# Patient Record
Sex: MALE | Race: WHITE | NOT HISPANIC OR LATINO | Employment: OTHER | URBAN - NONMETROPOLITAN AREA
[De-identification: names, ages, dates, MRNs, and addresses within clinical notes are randomized per-mention and may not be internally consistent; named-entity substitution may affect disease eponyms.]

---

## 2023-10-03 ENCOUNTER — TELEPHONE (OUTPATIENT)
Dept: CARDIOLOGY | Facility: CLINIC | Age: 68
End: 2023-10-03

## 2023-10-03 NOTE — TELEPHONE ENCOUNTER
Patient has an allergy to lipitor and was sent a prescription of Atorvastatin of 80mg to [facility] in [place].  Are you aware of this contraindication     Please advise     LUCHO [phone]

## 2023-10-05 ENCOUNTER — PATIENT OUTREACH (OUTPATIENT)
Dept: CARDIOLOGY | Facility: CLINIC | Age: 68
End: 2023-10-05
Payer: MEDICARE

## 2023-10-05 ENCOUNTER — DOCUMENTATION (OUTPATIENT)
Dept: CARDIOLOGY | Facility: CLINIC | Age: 68
End: 2023-10-05
Payer: MEDICARE

## 2023-10-05 NOTE — PROGRESS NOTES
Discharge Facility: [facility] Diagnosis: syncope/collapse  Admission Date: 10/1/23  Discharge Date: 10/4/23    PCP Appointment Date: Cardiologist 10/11/23  Specialist Appointment Date:   Hospital Encounter and Summary: not available at this time   See discharge assessment below for further details     Engagement  Call Start Time: 1010 (10/5/2023 10:20 AM)    Medications  Medications reviewed with patient/caregiver?: -- (no medicaitons rec list) (10/5/2023 10:20 AM)  Is the patient having any side effects they believe may be caused by any medication additions or changes?: No (10/5/2023 10:20 AM)  Does the patient have all medications ordered at discharge?: Yes (He is taking all medications from discharge) (10/5/2023 10:20 AM)  Is the patient taking all medications as directed (includes completed medication regime)?: Yes (10/5/2023 10:20 AM)    Appointments  Does the patient have a primary care provider?: Yes (10/5/2023 10:20 AM)  Care Management Interventions: Verified appointment date/time/provider (Patient see cardiology on 10/11/23) (10/5/2023 10:20 AM)    Self Management  What is the home health agency?: n/a (10/5/2023 10:20 AM)    Patient Teaching  What is the patient's perception of their health status since discharge?: Improving (10/5/2023 10:20 AM)  Is the patient/caregiver able to teach back the hierarchy of who to call/visit for symptoms/problems? PCP, Specialist, Home Health nurse, Urgent Care, ED, 911: Yes (10/5/2023 10:20 AM)    Wrap Up  Wrap Up Additional Comments: -- (Patient states he is feeling better and is now staying with his son. He does states he has follow ups with cardiology and wounds.) (10/5/2023 10:20 AM)  Call End Time: 1022 (10/5/2023 10:20 AM)

## 2023-10-06 ENCOUNTER — PATIENT OUTREACH (OUTPATIENT)
Dept: CARDIOLOGY | Facility: CLINIC | Age: 68
End: 2023-10-06
Payer: MEDICARE

## 2023-10-09 ENCOUNTER — APPOINTMENT (OUTPATIENT)
Dept: CARDIOLOGY | Facility: CLINIC | Age: 68
End: 2023-10-09
Payer: MEDICARE

## 2023-10-10 PROBLEM — M25.461 KNEE EFFUSION, RIGHT: Status: ACTIVE | Noted: 2022-02-07

## 2023-10-10 PROBLEM — D72.829 LEUKOCYTOSIS: Status: ACTIVE | Noted: 2022-02-08

## 2023-10-10 PROBLEM — M17.11 PRIMARY OSTEOARTHRITIS OF RIGHT KNEE: Status: ACTIVE | Noted: 2021-10-12

## 2023-10-10 PROBLEM — F10.10 ETOH ABUSE: Status: ACTIVE | Noted: 2022-02-09

## 2023-10-10 PROBLEM — F17.200 CURRENT EVERY DAY SMOKER: Status: ACTIVE | Noted: 2023-10-10

## 2023-10-10 PROBLEM — Z98.61 HISTORY OF PTCA: Status: ACTIVE | Noted: 2023-10-10

## 2023-10-10 PROBLEM — Z96.651 S/P RIGHT UNICOMPARTMENTAL KNEE REPLACEMENT: Status: ACTIVE | Noted: 2021-11-08

## 2023-10-10 PROBLEM — T84.53XA INFECTION OF PROSTHETIC RIGHT KNEE JOINT (CMS-HCC): Status: ACTIVE | Noted: 2022-02-07

## 2023-10-10 PROBLEM — E66.3 OVERWEIGHT: Status: ACTIVE | Noted: 2023-09-06

## 2023-10-10 RX ORDER — CLOPIDOGREL BISULFATE 75 MG/1
75 TABLET ORAL
COMMUNITY
Start: 2021-10-13 | End: 2023-10-11 | Stop reason: CLARIF

## 2023-10-10 RX ORDER — MELOXICAM 15 MG/1
15 TABLET ORAL EVERY MORNING
COMMUNITY
Start: 2023-09-06 | End: 2023-10-11

## 2023-10-10 RX ORDER — ENALAPRIL MALEATE 10 MG/1
10 TABLET ORAL DAILY
COMMUNITY
Start: 2006-03-27

## 2023-10-10 RX ORDER — METOPROLOL SUCCINATE 100 MG/1
TABLET, EXTENDED RELEASE ORAL
COMMUNITY
Start: 2023-07-11

## 2023-10-10 RX ORDER — NITROGLYCERIN 0.4 MG/1
TABLET SUBLINGUAL
COMMUNITY
Start: 2023-10-03

## 2023-10-10 RX ORDER — TICAGRELOR 90 MG/1
TABLET ORAL
COMMUNITY
Start: 2023-10-03 | End: 2024-04-17 | Stop reason: ALTCHOICE

## 2023-10-10 RX ORDER — OMEPRAZOLE 20 MG/1
20 CAPSULE, DELAYED RELEASE ORAL
COMMUNITY
End: 2023-10-11

## 2023-10-10 RX ORDER — AMOXICILLIN 875 MG/1
875 TABLET, FILM COATED ORAL 2 TIMES DAILY
COMMUNITY
Start: 2022-05-12 | End: 2023-10-11

## 2023-10-10 RX ORDER — AMOXICILLIN 500 MG/1
CAPSULE ORAL
COMMUNITY
End: 2024-04-17 | Stop reason: ALTCHOICE

## 2023-10-10 RX ORDER — ENALAPRIL MALEATE AND HYDROCHLOROTHIAZIDE 5; 12.5 MG/1; MG/1
TABLET ORAL
COMMUNITY
Start: 2023-08-22 | End: 2024-04-17 | Stop reason: ALTCHOICE

## 2023-10-10 RX ORDER — ASPIRIN 325 MG
1 TABLET ORAL DAILY
COMMUNITY
End: 2023-10-11

## 2023-10-10 RX ORDER — OMEPRAZOLE 20 MG/1
20 TABLET, DELAYED RELEASE ORAL
COMMUNITY

## 2023-10-10 RX ORDER — EZETIMIBE 10 MG/1
1 TABLET ORAL NIGHTLY
COMMUNITY
Start: 2021-11-16 | End: 2023-10-11 | Stop reason: SDUPTHER

## 2023-10-10 RX ORDER — ATORVASTATIN CALCIUM 80 MG/1
TABLET, FILM COATED ORAL
COMMUNITY
Start: 2023-10-03 | End: 2023-10-11

## 2023-10-11 ENCOUNTER — OFFICE VISIT (OUTPATIENT)
Dept: CARDIOLOGY | Facility: CLINIC | Age: 68
End: 2023-10-11
Payer: MEDICARE

## 2023-10-11 VITALS
WEIGHT: 189 LBS | HEART RATE: 66 BPM | SYSTOLIC BLOOD PRESSURE: 136 MMHG | DIASTOLIC BLOOD PRESSURE: 86 MMHG | BODY MASS INDEX: 27.06 KG/M2 | HEIGHT: 70 IN

## 2023-10-11 DIAGNOSIS — Z95.1 HISTORY OF CORONARY ARTERY BYPASS SURGERY: ICD-10-CM

## 2023-10-11 DIAGNOSIS — I25.2 HX OF MYOCARDIAL INFARCTION: ICD-10-CM

## 2023-10-11 DIAGNOSIS — E78.2 MIXED HYPERLIPIDEMIA: ICD-10-CM

## 2023-10-11 DIAGNOSIS — I25.10 ATHEROSCLEROSIS OF NATIVE CORONARY ARTERY OF NATIVE HEART WITHOUT ANGINA PECTORIS: Primary | ICD-10-CM

## 2023-10-11 DIAGNOSIS — I10 ESSENTIAL HYPERTENSION: ICD-10-CM

## 2023-10-11 DIAGNOSIS — Z98.61 HISTORY OF PTCA: ICD-10-CM

## 2023-10-11 PROBLEM — F17.200 CURRENT EVERY DAY SMOKER: Status: RESOLVED | Noted: 2023-10-10 | Resolved: 2023-10-11

## 2023-10-11 PROCEDURE — 1159F MED LIST DOCD IN RCRD: CPT | Performed by: INTERNAL MEDICINE

## 2023-10-11 PROCEDURE — 3075F SYST BP GE 130 - 139MM HG: CPT | Performed by: INTERNAL MEDICINE

## 2023-10-11 PROCEDURE — 99214 OFFICE O/P EST MOD 30 MIN: CPT | Performed by: INTERNAL MEDICINE

## 2023-10-11 PROCEDURE — 3079F DIAST BP 80-89 MM HG: CPT | Performed by: INTERNAL MEDICINE

## 2023-10-11 PROCEDURE — 1036F TOBACCO NON-USER: CPT | Performed by: INTERNAL MEDICINE

## 2023-10-11 RX ORDER — ASPIRIN 81 MG/1
81 TABLET ORAL DAILY
COMMUNITY

## 2023-10-11 RX ORDER — EZETIMIBE 10 MG/1
10 TABLET ORAL NIGHTLY
Qty: 90 TABLET | Refills: 3 | Status: SHIPPED | OUTPATIENT
Start: 2023-10-11 | End: 2024-10-10

## 2023-10-11 ASSESSMENT — PATIENT HEALTH QUESTIONNAIRE - PHQ9
2. FEELING DOWN, DEPRESSED OR HOPELESS: NOT AT ALL
SUM OF ALL RESPONSES TO PHQ9 QUESTIONS 1 AND 2: 0
1. LITTLE INTEREST OR PLEASURE IN DOING THINGS: NOT AT ALL

## 2023-10-11 ASSESSMENT — ENCOUNTER SYMPTOMS
LOSS OF SENSATION IN FEET: 0
DEPRESSION: 0
OCCASIONAL FEELINGS OF UNSTEADINESS: 0

## 2023-10-11 NOTE — PATIENT INSTRUCTIONS
Please bring all medicines, vitamins, and herbal supplements with you when you come to the office.    Prescriptions will not be filled unless you are compliant with your follow up appointments or have a follow up appointment scheduled as per instruction of your physician. Refills should be requested at the time of your visit.     Cardiac rehab referral

## 2023-10-11 NOTE — PROGRESS NOTES
Subjective   Henrique Carvajal is a 68 y.o. male       Chief Complaint    Hospital Follow-up          HPI     Discussed recent mi and amnesia    Patient is seen in early follow-up of recent hospitalization.  He had an episode of confusion and was found wandering the streets in his underwear.  Police took him into custody and to the emergency room.  On arrival it was clear he is having an acute inferolateral myocardial infarction he underwent prompt intervention by Dr. Hidalgo and did well.  Postintervention echo demonstrated minimal impairment of left ventricular function.    He is now doing well.  He denies any angina CHF or arrhythmia symptomatology we reviewed his medical therapy and the rationale.  He was also advised that enrollment in cardiac rehab would have benefits, and he is willing to do so.    We discussed briefly the merits of diet exercise and weight loss and most importantly tobacco abstinence.    Review of Systems   All other systems reviewed and are negative.           Objective   Physical Exam  Constitutional:       Appearance: Normal appearance. He is normal weight.   HENT:      Nose: Nose normal.   Neck:      Vascular: No carotid bruit.   Cardiovascular:      Rate and Rhythm: Normal rate.      Pulses: Normal pulses.      Heart sounds: Normal heart sounds.   Pulmonary:      Effort: Pulmonary effort is normal.   Abdominal:      General: Bowel sounds are normal.      Palpations: Abdomen is soft.   Genitourinary:     Rectum: Normal.   Musculoskeletal:         General: Normal range of motion.      Cervical back: Normal range of motion.      Right lower leg: No edema.      Left lower leg: No edema.   Skin:     General: Skin is warm and dry.   Neurological:      General: No focal deficit present.      Mental Status: He is alert.   Psychiatric:         Mood and Affect: Mood normal.         Behavior: Behavior normal.         Thought Content: Thought content normal.         Judgment: Judgment normal.  "          Current Outpatient Medications:     amoxicillin (Amoxil) 500 mg capsule, Take 500 mg by mouth., Disp: , Rfl:     aspirin 81 mg EC tablet, Take 1 tablet (81 mg) by mouth once daily., Disp: , Rfl:     Brilinta 90 mg tablet, , Disp: , Rfl:     enalapril (Vasotec) 10 mg tablet, Take 1 tablet (10 mg) by mouth once daily., Disp: , Rfl:     metoprolol succinate XL (Toprol-XL) 100 mg 24 hr tablet, , Disp: , Rfl:     nitroglycerin (Nitrostat) 0.4 mg SL tablet, , Disp: , Rfl:     omeprazole OTC (PriLOSEC OTC) 20 mg EC tablet, Take 1 tablet (20 mg) by mouth once daily in the morning. Take before meals., Disp: , Rfl:     enalapriL-hydrochlorothiazide 5-12.5 mg tablet, , Disp: , Rfl:     ezetimibe (Zetia) 10 mg tablet, Take 1 tablet (10 mg) by mouth once daily at bedtime., Disp: , Rfl:        Visit Vitals  /90 (BP Location: Right arm, Patient Position: Sitting)   Pulse 66   Ht 1.778 m (5' 10\")   Wt 85.7 kg (189 lb)   BMI 27.12 kg/m²   Smoking Status Former   BSA 2.06 m²                 Assessment/Plan   1. Atherosclerosis of native coronary artery of native heart without angina pectoris        2. History of coronary artery bypass surgery        3. History of PTCA        4. Hx of myocardial infarction        5. Mixed hyperlipidemia        6. Essential hypertension           "

## 2023-10-18 ENCOUNTER — PATIENT OUTREACH (OUTPATIENT)
Dept: CARDIOLOGY | Facility: CLINIC | Age: 68
End: 2023-10-18
Payer: MEDICARE

## 2023-10-18 NOTE — PROGRESS NOTES
Unable to reach patient for call back after patient's follow up appointment with PCP.   CRISTOM with call back number for patient to call if needed   If no voicemail available call attempts x 2 were made to contact the patient to assist with any questions or concerns patient may have.

## 2023-11-16 ENCOUNTER — PATIENT OUTREACH (OUTPATIENT)
Dept: CARDIOLOGY | Facility: CLINIC | Age: 68
End: 2023-11-16
Payer: MEDICARE

## 2023-12-13 ENCOUNTER — PATIENT OUTREACH (OUTPATIENT)
Dept: CARDIOLOGY | Facility: CLINIC | Age: 68
End: 2023-12-13
Payer: MEDICARE

## 2024-04-17 ENCOUNTER — OFFICE VISIT (OUTPATIENT)
Dept: CARDIOLOGY | Facility: CLINIC | Age: 69
End: 2024-04-17
Payer: MEDICARE

## 2024-04-17 VITALS
WEIGHT: 186 LBS | HEIGHT: 70 IN | HEART RATE: 60 BPM | DIASTOLIC BLOOD PRESSURE: 66 MMHG | BODY MASS INDEX: 26.63 KG/M2 | SYSTOLIC BLOOD PRESSURE: 116 MMHG

## 2024-04-17 DIAGNOSIS — Z95.1 HISTORY OF CORONARY ARTERY BYPASS SURGERY: ICD-10-CM

## 2024-04-17 DIAGNOSIS — I10 ESSENTIAL HYPERTENSION: Primary | ICD-10-CM

## 2024-04-17 DIAGNOSIS — I25.10 ATHEROSCLEROSIS OF NATIVE CORONARY ARTERY OF NATIVE HEART WITHOUT ANGINA PECTORIS: ICD-10-CM

## 2024-04-17 DIAGNOSIS — E78.2 MIXED HYPERLIPIDEMIA: ICD-10-CM

## 2024-04-17 DIAGNOSIS — Z98.61 HISTORY OF PTCA: ICD-10-CM

## 2024-04-17 PROCEDURE — 3078F DIAST BP <80 MM HG: CPT | Performed by: INTERNAL MEDICINE

## 2024-04-17 PROCEDURE — 99214 OFFICE O/P EST MOD 30 MIN: CPT | Performed by: INTERNAL MEDICINE

## 2024-04-17 PROCEDURE — 3074F SYST BP LT 130 MM HG: CPT | Performed by: INTERNAL MEDICINE

## 2024-04-17 PROCEDURE — 1159F MED LIST DOCD IN RCRD: CPT | Performed by: INTERNAL MEDICINE

## 2024-04-17 PROCEDURE — 1036F TOBACCO NON-USER: CPT | Performed by: INTERNAL MEDICINE

## 2024-04-17 RX ORDER — CLOPIDOGREL BISULFATE 75 MG/1
75 TABLET ORAL DAILY
Qty: 90 TABLET | Refills: 3 | Status: SHIPPED | OUTPATIENT
Start: 2024-04-17 | End: 2025-04-17

## 2024-04-17 NOTE — PATIENT INSTRUCTIONS
Please bring all medicines, vitamins, and herbal supplements with you when you come to the office.    Prescriptions will not be filled unless you are compliant with your follow up appointments or have a follow up appointment scheduled as per instruction of your physician. Refills should be requested at the time of your visit.     BMI was above normal measurement. Current weight: 84.4 kg (186 lb)  Weight change since last visit (-) denotes wt loss -3 lbs   Weight loss needed to achieve BMI 25: 12.1 Lbs  Weight loss needed to achieve BMI 30: -22.6 Lbs  Provided instructions on dietary changes  Provided instructions on exercise.

## 2024-04-17 NOTE — PROGRESS NOTES
"Subjective   Henrique Carvajal is a 68 y.o. male       Chief Complaint    Follow-up          HPI     Patient returns in follow-up of problems as noted.  In the interim he has done relatively well but states he has shortness of breath.  He had intervention 6 months ago and it is unlikely that he has had progression of disease or in-stent stenosis because of this I am doubtful that his symptoms are on the basis of coronary disease.  We note he is on Brilinta and I suggested to him this could explain his shortness of breath and because of this (and the fact he forgets to take his evening dose) I suggested stopping the Brilinta and instead reinitiating his Plavix 75 mg a day.  I suggested follow-up with nurse practitioner month to see if his complaints of shortness of breath improved.  If so we will deem them on the basis of Brilinta but if not he may require stress testing with isotope imaging.    Review of other treatment issues demonstrates that treatment and control of lipids and blood pressure is acceptable because of this no suggestions are made regarding adjustment.  Lastly we did advocate the merits of diet and ideal body mass index and he is close to achieving his goal.      Vitals:    04/17/24 1518   BP: 116/66   BP Location: Right arm   Patient Position: Sitting   Pulse: 60   Weight: 84.4 kg (186 lb)   Height: 1.778 m (5' 10\")        Objective   Physical Exam  Constitutional:       Appearance: Normal appearance.   HENT:      Nose: Nose normal.   Neck:      Vascular: No carotid bruit.   Cardiovascular:      Rate and Rhythm: Normal rate.      Pulses: Normal pulses.      Heart sounds: Normal heart sounds.   Pulmonary:      Effort: Pulmonary effort is normal.   Abdominal:      General: Bowel sounds are normal.      Palpations: Abdomen is soft.   Musculoskeletal:         General: Normal range of motion.      Cervical back: Normal range of motion.      Right lower leg: No edema.      Left lower leg: No edema. "   Skin:     General: Skin is warm and dry.   Neurological:      General: No focal deficit present.      Mental Status: He is alert.   Psychiatric:         Mood and Affect: Mood normal.         Behavior: Behavior normal.         Thought Content: Thought content normal.         Judgment: Judgment normal.         Allergies  Lipitor [atorvastatin], Pravastatin, Rosuvastatin, Simvastatin, and Statins-hmg-coa reductase inhibitors     Current Medications    Current Outpatient Medications:     aspirin 81 mg EC tablet, Take 1 tablet (81 mg) by mouth once daily., Disp: , Rfl:     Brilinta 90 mg tablet, , Disp: , Rfl:     enalapril (Vasotec) 10 mg tablet, Take 1 tablet (10 mg) by mouth once daily., Disp: , Rfl:     ezetimibe (Zetia) 10 mg tablet, Take 1 tablet (10 mg) by mouth once daily at bedtime., Disp: 90 tablet, Rfl: 3    metoprolol succinate XL (Toprol-XL) 100 mg 24 hr tablet, , Disp: , Rfl:     nitroglycerin (Nitrostat) 0.4 mg SL tablet, , Disp: , Rfl:     omeprazole OTC (PriLOSEC OTC) 20 mg EC tablet, Take 1 tablet (20 mg) by mouth once daily in the morning. Take before meals., Disp: , Rfl:                      Assessment/Plan      1. Atherosclerosis of native coronary artery of native heart without angina pectoris  Complaints of dyspnea today are possibly due to myocardial ischemia but before reassessing I suggested stopping Brilinta and clinically reassessing in a month.  - Follow Up In Cardiology    2. History of coronary artery bypass surgery  Historically performed in the past.  Up until 6 months ago a durable result has been achieved  - Follow Up In Cardiology    3. History of PTCA  In October of last year.  He had progression of disease necessitating intervention  - Follow Up In Cardiology    4. Essential hypertension  Review of treatment strategy demonstrates good control    5. Mixed hyperlipidemia  Review of treatment strategy demonstrates good control        Scribe Attestation  By signing my name below, I,  Juhi MICHAEL LPN, Scribe   attest that this documentation has been prepared under the direction and in the presence of Grzegorz Edwards MD.     Provider Attestation - Scribe documentation    All medical record entries made by the Scribe were at my direction and personally dictated by me. I have reviewed the chart and agree that the record accurately reflects my personal performance of the history, physical exam, discussion and plan.

## 2024-04-17 NOTE — LETTER
"April 17, 2024     Candido Victor MD  [address] [place] [address]    Patient: Henrique Carvajal   YOB: 1955   Date of Visit: 4/17/2024       Dear Dr. Candido Victor MD:    Thank you for referring Henrique Carvajal to me for evaluation. Below are my notes for this consultation.  If you have questions, please do not hesitate to call me. I look forward to following your patient along with you.       Sincerely,     Grzegorz Edwards MD      CC: No Recipients  ______________________________________________________________________________________    Subjective   Henrique Carvajal is a 68 y.o. male       Chief Complaint    Follow-up          HPI     Patient returns in follow-up of problems as noted.  In the interim he has done relatively well but states he has shortness of breath.  He had intervention 6 months ago and it is unlikely that he has had progression of disease or in-stent stenosis because of this I am doubtful that his symptoms are on the basis of coronary disease.  We note he is on Brilinta and I suggested to him this could explain his shortness of breath and because of this (and the fact he forgets to take his evening dose) I suggested stopping the Brilinta and instead reinitiating his Plavix 75 mg a day.  I suggested follow-up with nurse practitioner month to see if his complaints of shortness of breath improved.  If so we will deem them on the basis of Brilinta but if not he may require stress testing with isotope imaging.    Review of other treatment issues demonstrates that treatment and control of lipids and blood pressure is acceptable because of this no suggestions are made regarding adjustment.  Lastly we did advocate the merits of diet and ideal body mass index and he is close to achieving his goal.      Vitals:    04/17/24 1518   BP: 116/66   BP Location: Right arm   Patient Position: Sitting   Pulse: 60   Weight: 84.4 kg (186 lb)   Height: 1.778 m (5' 10\")    "     Objective   Physical Exam  Constitutional:       Appearance: Normal appearance.   HENT:      Nose: Nose normal.   Neck:      Vascular: No carotid bruit.   Cardiovascular:      Rate and Rhythm: Normal rate.      Pulses: Normal pulses.      Heart sounds: Normal heart sounds.   Pulmonary:      Effort: Pulmonary effort is normal.   Abdominal:      General: Bowel sounds are normal.      Palpations: Abdomen is soft.   Musculoskeletal:         General: Normal range of motion.      Cervical back: Normal range of motion.      Right lower leg: No edema.      Left lower leg: No edema.   Skin:     General: Skin is warm and dry.   Neurological:      General: No focal deficit present.      Mental Status: He is alert.   Psychiatric:         Mood and Affect: Mood normal.         Behavior: Behavior normal.         Thought Content: Thought content normal.         Judgment: Judgment normal.         Allergies  Lipitor [atorvastatin], Pravastatin, Rosuvastatin, Simvastatin, and Statins-hmg-coa reductase inhibitors     Current Medications    Current Outpatient Medications:   •  aspirin 81 mg EC tablet, Take 1 tablet (81 mg) by mouth once daily., Disp: , Rfl:   •  Brilinta 90 mg tablet, , Disp: , Rfl:   •  enalapril (Vasotec) 10 mg tablet, Take 1 tablet (10 mg) by mouth once daily., Disp: , Rfl:   •  ezetimibe (Zetia) 10 mg tablet, Take 1 tablet (10 mg) by mouth once daily at bedtime., Disp: 90 tablet, Rfl: 3  •  metoprolol succinate XL (Toprol-XL) 100 mg 24 hr tablet, , Disp: , Rfl:   •  nitroglycerin (Nitrostat) 0.4 mg SL tablet, , Disp: , Rfl:   •  omeprazole OTC (PriLOSEC OTC) 20 mg EC tablet, Take 1 tablet (20 mg) by mouth once daily in the morning. Take before meals., Disp: , Rfl:                      Assessment/Plan      1. Atherosclerosis of native coronary artery of native heart without angina pectoris  Complaints of dyspnea today are possibly due to myocardial ischemia but before reassessing I suggested stopping Brilinta and  clinically reassessing in a month.  - Follow Up In Cardiology    2. History of coronary artery bypass surgery  Historically performed in the past.  Up until 6 months ago a durable result has been achieved  - Follow Up In Cardiology    3. History of PTCA  In October of last year.  He had progression of disease necessitating intervention  - Follow Up In Cardiology    4. Essential hypertension  Review of treatment strategy demonstrates good control    5. Mixed hyperlipidemia  Review of treatment strategy demonstrates good control        Scribe Attestation  By signing my name below, IJuhi LPN, Scribe   attest that this documentation has been prepared under the direction and in the presence of Grzegorz Edwards MD.     Provider Attestation - Scribe documentation    All medical record entries made by the Scribe were at my direction and personally dictated by me. I have reviewed the chart and agree that the record accurately reflects my personal performance of the history, physical exam, discussion and plan.

## 2024-05-20 ENCOUNTER — OFFICE VISIT (OUTPATIENT)
Dept: CARDIOLOGY | Facility: CLINIC | Age: 69
End: 2024-05-20
Payer: MEDICARE

## 2024-05-20 VITALS
BODY MASS INDEX: 26.83 KG/M2 | HEIGHT: 70 IN | SYSTOLIC BLOOD PRESSURE: 114 MMHG | DIASTOLIC BLOOD PRESSURE: 64 MMHG | HEART RATE: 52 BPM | WEIGHT: 187.4 LBS

## 2024-05-20 DIAGNOSIS — R06.02 SHORTNESS OF BREATH: Primary | ICD-10-CM

## 2024-05-20 DIAGNOSIS — I25.10 ATHEROSCLEROSIS OF NATIVE CORONARY ARTERY OF NATIVE HEART WITHOUT ANGINA PECTORIS: ICD-10-CM

## 2024-05-20 PROBLEM — Z78.9 STATIN INTOLERANCE: Status: ACTIVE | Noted: 2024-05-20

## 2024-05-20 PROCEDURE — 1159F MED LIST DOCD IN RCRD: CPT | Performed by: NURSE PRACTITIONER

## 2024-05-20 PROCEDURE — 1160F RVW MEDS BY RX/DR IN RCRD: CPT | Performed by: NURSE PRACTITIONER

## 2024-05-20 PROCEDURE — 3074F SYST BP LT 130 MM HG: CPT | Performed by: NURSE PRACTITIONER

## 2024-05-20 PROCEDURE — 3008F BODY MASS INDEX DOCD: CPT | Performed by: NURSE PRACTITIONER

## 2024-05-20 PROCEDURE — 1036F TOBACCO NON-USER: CPT | Performed by: NURSE PRACTITIONER

## 2024-05-20 PROCEDURE — 3078F DIAST BP <80 MM HG: CPT | Performed by: NURSE PRACTITIONER

## 2024-05-20 PROCEDURE — 99212 OFFICE O/P EST SF 10 MIN: CPT | Performed by: NURSE PRACTITIONER

## 2024-05-20 ASSESSMENT — ENCOUNTER SYMPTOMS
ORTHOPNEA: 0
SYNCOPE: 0
DYSPNEA ON EXERTION: 0
IRREGULAR HEARTBEAT: 0
NEAR-SYNCOPE: 0
PND: 0
PALPITATIONS: 0

## 2024-05-20 NOTE — LETTER
"May 20, 2024     Candido Victor MD  [address] [place] [address]    Patient: Henrique Carvajal   YOB: 1955   Date of Visit: 5/20/2024       Dear Dr. Candido Victor MD:    Thank you for referring Henrique Carvajal to me for evaluation. Below are my notes for this consultation.  If you have questions, please do not hesitate to call me. I look forward to following your patient along with you.       Sincerely,     America Herzog, APRN-CNP      CC: No Recipients  ______________________________________________________________________________________    Chief Complaint  \"Doing better\"    Reason for Visit  1 month follow-up  Patient presents to the office today for outpatient follow-up for medication change.  Last evaluated in clinic by Dr. Vega April 2024.  At that time patient presented with complaints of dyspnea.  Brilinta was discontinued and he was initiated on Plavix.  He has been compliant with changes.    Presents today ambulatory with steady gait.    History of Present Illness   Patient is a very pleasant 68-year-old who presents today reportedly doing\" better\".  He has noted resolution of his dyspnea off of Brilinta.  He remains aerobically active and this week and was power washing his house, has been remodeling his home and denies any type of exertional symptoms.    Reportedly had lipids checked by PCP.    Is asking to interrupt DAPT for tooth extraction, educated would not be allowed to interrupt until after October 2024.    Patient reports that overall has no complaint(s) of chest pain, chest pressure/discomfort, claudication, dyspnea, and exertional chest pressure/discomfort    Review of Systems   Cardiovascular:  Negative for chest pain, dyspnea on exertion, irregular heartbeat, leg swelling, near-syncope, orthopnea, palpitations, paroxysmal nocturnal dyspnea and syncope.        Visit Vitals  /64 (BP Location: Left arm, Patient Position: Sitting)   Pulse 52 " "  Ht 1.778 m (5' 10\")   Wt 85 kg (187 lb 6.4 oz)   BMI 26.89 kg/m²   Smoking Status Former   BSA 2.05 m²     Physical Exam  Vitals and nursing note reviewed.   Constitutional:       Appearance: Normal appearance.   Cardiovascular:      Rate and Rhythm: Normal rate and regular rhythm.      Heart sounds: Normal heart sounds.   Pulmonary:      Effort: Pulmonary effort is normal.      Breath sounds: Normal breath sounds.   Musculoskeletal:      Cervical back: Full passive range of motion without pain.      Right lower leg: No edema.      Left lower leg: No edema.   Skin:     General: Skin is cool.   Neurological:      Mental Status: He is alert and oriented to person, place, and time.   Psychiatric:         Attention and Perception: Attention normal.         Mood and Affect: Mood normal.         Behavior: Behavior is cooperative.        Allergies   Allergen Reactions   • Lipitor [Atorvastatin] Myalgia   • Pravastatin Myalgia   • Rosuvastatin Unknown     Other Reaction(s): myalgia   • Simvastatin Unknown     Other Reaction(s): Muscular pain   • Statins-Hmg-Coa Reductase Inhibitors Myalgia     Other Reaction(s): Muscular pain    myalgias sudden onset with increase in triglycerides     Current Outpatient Medications   Medication Instructions   • aspirin 81 mg, oral, Daily   • clopidogrel (PLAVIX) 75 mg, oral, Daily   • enalapril (VASOTEC) 10 mg, oral, Daily   • ezetimibe (ZETIA) 10 mg, oral, Nightly   • metoprolol succinate XL (Toprol-XL) 100 mg 24 hr tablet    • nitroglycerin (Nitrostat) 0.4 mg SL tablet    • omeprazole OTC (PRILOSEC OTC) 20 mg, oral, Daily before breakfast      Assessment:    Shortness of breath  Resolved off Brilinta    BMI 26.0-26.9,adult  Reviewed the merits of healthy lifestyle choices on overall cardiovascular health.      Atherosclerosis of coronary artery  October 1, 2023 inferior lateral STEMI    Ramus PCI/Lisandro  OM1 PCI/Lisandro  LIMA-LAD patent    Known occluded sequential SVG-D1-D2  Known occluded " SVG to RCA: Occluded native RCA    LVEF 50%    Current daily activity greater than 4 METS without concerning symptoms    Plan:     Through informed decision making process incorporating patients unique circumstances, the following treatment plan will be initiated:    1.  Prescription drug management of cardiovascular medication for efficacy, adherence to treatment, side effect assessment and polypharmacy. Current treatment clinically warranted and to continue without modifications.    2. Return for follow-up; in the interim, contact the office if new symptoms arise.  Dr. Hidalgo 6 months     America Herzog  MSN, APRN-CNP, PMHNP-BC  [facility]    Please excuse any errors in grammar or translation related to this dictation. Voice recognition software was utilized to prepare this document.

## 2024-05-20 NOTE — ASSESSMENT & PLAN NOTE
October 1, 2023 inferior lateral STEMI    Ramus PCI/Lisandro  OM1 PCI/Lisandro  LIMA-LAD patent    Known occluded sequential SVG-D1-D2  Known occluded SVG to RCA: Occluded native RCA    LVEF 50%    Current daily activity greater than 4 METS without concerning symptoms

## 2024-05-20 NOTE — PROGRESS NOTES
"Chief Complaint  \"Doing better\"    Reason for Visit  1 month follow-up  Patient presents to the office today for outpatient follow-up for medication change.  Last evaluated in clinic by Dr. Vega April 2024.  At that time patient presented with complaints of dyspnea.  Brilinta was discontinued and he was initiated on Plavix.  He has been compliant with changes.    Presents today ambulatory with steady gait.    History of Present Illness   Patient is a very pleasant 68-year-old who presents today reportedly doing\" better\".  He has noted resolution of his dyspnea off of Brilinta.  He remains aerobically active and this week and was power washing his house, has been remodeling his home and denies any type of exertional symptoms.    Reportedly had lipids checked by PCP.    Is asking to interrupt DAPT for tooth extraction, educated would not be allowed to interrupt until after October 2024.    Patient reports that overall has no complaint(s) of chest pain, chest pressure/discomfort, claudication, dyspnea, and exertional chest pressure/discomfort    Review of Systems   Cardiovascular:  Negative for chest pain, dyspnea on exertion, irregular heartbeat, leg swelling, near-syncope, orthopnea, palpitations, paroxysmal nocturnal dyspnea and syncope.        Visit Vitals  /64 (BP Location: Left arm, Patient Position: Sitting)   Pulse 52   Ht 1.778 m (5' 10\")   Wt 85 kg (187 lb 6.4 oz)   BMI 26.89 kg/m²   Smoking Status Former   BSA 2.05 m²     Physical Exam  Vitals and nursing note reviewed.   Constitutional:       Appearance: Normal appearance.   Cardiovascular:      Rate and Rhythm: Normal rate and regular rhythm.      Heart sounds: Normal heart sounds.   Pulmonary:      Effort: Pulmonary effort is normal.      Breath sounds: Normal breath sounds.   Musculoskeletal:      Cervical back: Full passive range of motion without pain.      Right lower leg: No edema.      Left lower leg: No edema.   Skin:     General: Skin is " cool.   Neurological:      Mental Status: He is alert and oriented to person, place, and time.   Psychiatric:         Attention and Perception: Attention normal.         Mood and Affect: Mood normal.         Behavior: Behavior is cooperative.        Allergies   Allergen Reactions    Lipitor [Atorvastatin] Myalgia    Pravastatin Myalgia    Rosuvastatin Unknown     Other Reaction(s): myalgia    Simvastatin Unknown     Other Reaction(s): Muscular pain    Statins-Hmg-Coa Reductase Inhibitors Myalgia     Other Reaction(s): Muscular pain    myalgias sudden onset with increase in triglycerides     Current Outpatient Medications   Medication Instructions    aspirin 81 mg, oral, Daily    clopidogrel (PLAVIX) 75 mg, oral, Daily    enalapril (VASOTEC) 10 mg, oral, Daily    ezetimibe (ZETIA) 10 mg, oral, Nightly    metoprolol succinate XL (Toprol-XL) 100 mg 24 hr tablet     nitroglycerin (Nitrostat) 0.4 mg SL tablet     omeprazole OTC (PRILOSEC OTC) 20 mg, oral, Daily before breakfast      Assessment:    Shortness of breath  Resolved off Brilinta    BMI 26.0-26.9,adult  Reviewed the merits of healthy lifestyle choices on overall cardiovascular health.      Atherosclerosis of coronary artery  October 1, 2023 inferior lateral STEMI    [facility] PCI/[place]  OM1 PCI/[place]  LIMA-LAD patent    Known occluded sequential SVG-D1-D2  Known occluded SVG to RCA: Occluded native RCA    LVEF 50%    Current daily activity greater than 4 METS without concerning symptoms    Plan:     Through informed decision making process incorporating patients unique circumstances, the following treatment plan will be initiated:    1.  Prescription drug management of cardiovascular medication for efficacy, adherence to treatment, side effect assessment and polypharmacy. Current treatment clinically warranted and to continue without modifications.    2. Return for follow-up; in the interim, contact the office if new symptoms arise.  Dr. Hidalgo 6 months     [place]  Raoy Herzog  MSN, APRN-CNP, PMHNP-BC  [facility]    Please excuse any errors in grammar or translation related to this dictation. Voice recognition software was utilized to prepare this document.

## 2024-05-20 NOTE — PATIENT INSTRUCTIONS
Please bring all medicines, vitamins, and herbal supplements with you when you come to the office.    Prescriptions will not be filled unless you are compliant with your follow up appointments or have a follow up appointment scheduled as per instruction of your physician. Refills should be requested at the time of your visit.     PLAN:   Through informed decision making process incorporating patients unique circumstances, the following treatment plan will be initiated:    1.  Prescription drug management of cardiovascular medication for efficacy, adherence to treatment, side effect assessment and polypharmacy. Current treatment clinically warranted and to continue without modifications.    2. Return for follow-up; in the interim, contact the office if new symptoms arise.  Dr. Hidalgo 6 months

## 2024-08-06 ENCOUNTER — APPOINTMENT (OUTPATIENT)
Dept: CARDIOLOGY | Facility: CLINIC | Age: 69
End: 2024-08-06
Payer: MEDICARE

## 2024-08-06 VITALS
HEART RATE: 52 BPM | DIASTOLIC BLOOD PRESSURE: 50 MMHG | SYSTOLIC BLOOD PRESSURE: 142 MMHG | BODY MASS INDEX: 27.85 KG/M2 | WEIGHT: 188 LBS | HEIGHT: 69 IN

## 2024-08-06 DIAGNOSIS — E78.2 MIXED HYPERLIPIDEMIA: ICD-10-CM

## 2024-08-06 DIAGNOSIS — I10 ESSENTIAL HYPERTENSION: ICD-10-CM

## 2024-08-06 DIAGNOSIS — Z98.61 HISTORY OF PTCA: ICD-10-CM

## 2024-08-06 DIAGNOSIS — Z87.891 FORMER SMOKER: ICD-10-CM

## 2024-08-06 DIAGNOSIS — Z95.1 HISTORY OF CORONARY ARTERY BYPASS SURGERY: ICD-10-CM

## 2024-08-06 DIAGNOSIS — I25.2 HX OF MYOCARDIAL INFARCTION: ICD-10-CM

## 2024-08-06 DIAGNOSIS — Z78.9 STATIN INTOLERANCE: ICD-10-CM

## 2024-08-06 DIAGNOSIS — I25.10 ATHEROSCLEROSIS OF CORONARY ARTERY OF NATIVE HEART, UNSPECIFIED VESSEL OR LESION TYPE, UNSPECIFIED WHETHER ANGINA PRESENT: ICD-10-CM

## 2024-08-06 PROCEDURE — 3077F SYST BP >= 140 MM HG: CPT | Performed by: INTERNAL MEDICINE

## 2024-08-06 PROCEDURE — 3008F BODY MASS INDEX DOCD: CPT | Performed by: INTERNAL MEDICINE

## 2024-08-06 PROCEDURE — 1159F MED LIST DOCD IN RCRD: CPT | Performed by: INTERNAL MEDICINE

## 2024-08-06 PROCEDURE — 1036F TOBACCO NON-USER: CPT | Performed by: INTERNAL MEDICINE

## 2024-08-06 PROCEDURE — 99215 OFFICE O/P EST HI 40 MIN: CPT | Performed by: INTERNAL MEDICINE

## 2024-08-06 PROCEDURE — 1160F RVW MEDS BY RX/DR IN RCRD: CPT | Performed by: INTERNAL MEDICINE

## 2024-08-06 PROCEDURE — 3078F DIAST BP <80 MM HG: CPT | Performed by: INTERNAL MEDICINE

## 2024-08-06 NOTE — PROGRESS NOTES
"Subjective   Henrique Carvajal is a 68 y.o. male       Chief Complaint    Annual Exam          68-year-old gentleman returns for follow-up to see me for the first time, former patient of Dr. Edwards's.  He underwent acute coronary syndrome/non-ST elevation MI last October where I took him to the Cath Lab and underwent revascularization of the ramus branch and obtuse marginal branch x 2 drug-eluting stents.  Notably his sequential vein graft to the first and second diagonal branch and graft to the RCA and native RCA are occluded.  LIMA to LAD is patent but distal LAD has diffuse 50 to 70% small vessel disease, left ventricular function is low normal angiographically.    He has no recurrent angina, nitrate usage, hospitalizations, heart failure, shortness of breath.  His dyspnea is improved after switching from Brilinta to clopidogrel.  He is completely intolerant to statins and has tried all 4 different statins unsuccessfully.    He has previous history of myocardial infarction's treated in [facility] and in [place] with stenting and subsequent multivessel CABG in [place] dating back several years.    He is hemodynamically stable, continues working on a daily basis for his NYHA class is I/C    Recommendations, initiate investigation on Repatha injections, continue current therapies, renew Zetia, follow-up in 6 months         Review of Systems   All other systems reviewed and are negative.           Vitals:    08/06/24 1030   BP: 142/50   BP Location: Left arm   Patient Position: Sitting   Pulse: 52   Weight: 85.3 kg (188 lb)   Height: 1.753 m (5' 9\")        Objective   Physical Exam  Constitutional:       Appearance: Normal appearance.   HENT:      Nose: Nose normal.   Neck:      Vascular: No carotid bruit.   Cardiovascular:      Rate and Rhythm: Normal rate.      Pulses: Normal pulses.      Heart sounds: Normal heart sounds.   Pulmonary:      Effort: Pulmonary effort is normal.   Abdominal:      General: Bowel " sounds are normal.      Palpations: Abdomen is soft.   Musculoskeletal:         General: Normal range of motion.      Cervical back: Normal range of motion.      Right lower leg: No edema.      Left lower leg: No edema.   Skin:     General: Skin is warm and dry.   Neurological:      General: No focal deficit present.      Mental Status: He is alert.   Psychiatric:         Mood and Affect: Mood normal.         Behavior: Behavior normal.         Thought Content: Thought content normal.         Judgment: Judgment normal.         Allergies  Lipitor [atorvastatin], Pravastatin, Rosuvastatin, Simvastatin, and Statins-hmg-coa reductase inhibitors     Current Medications    Current Outpatient Medications:     aspirin 81 mg EC tablet, Take 1 tablet (81 mg) by mouth once daily., Disp: , Rfl:     clopidogrel (Plavix) 75 mg tablet, Take 1 tablet (75 mg) by mouth once daily., Disp: 90 tablet, Rfl: 3    enalapril (Vasotec) 10 mg tablet, Take 1 tablet (10 mg) by mouth once daily., Disp: , Rfl:     ezetimibe (Zetia) 10 mg tablet, Take 1 tablet (10 mg) by mouth once daily at bedtime., Disp: 90 tablet, Rfl: 3    metoprolol succinate XL (Toprol-XL) 100 mg 24 hr tablet, , Disp: , Rfl:     nitroglycerin (Nitrostat) 0.4 mg SL tablet, , Disp: , Rfl:     omeprazole OTC (PriLOSEC OTC) 20 mg EC tablet, Take 1 tablet (20 mg) by mouth once daily in the morning. Take before meals., Disp: , Rfl:                      Assessment/Plan   1. Atherosclerosis of coronary artery of native heart, unspecified vessel or lesion type, unspecified whether angina present        2. History of coronary artery bypass surgery        3. Hx of myocardial infarction        4. Statin intolerance        5. Essential hypertension        6. History of PTCA        7. Mixed hyperlipidemia        8. BMI 27.0-27.9,adult        9. Former smoker                 Scribe Attestation  By signing my name below, Joceline SAHA RN   , Scribe   attest that this documentation has been  prepared under the direction and in the presence of Grzegorz Hidalgo DO.     Provider Attestation - Scribe documentation    All medical record entries made by the Scribe were at my direction and personally dictated by me. I have reviewed the chart and agree that the record accurately reflects my personal performance of the history, physical exam, discussion and plan.

## 2024-08-06 NOTE — LETTER
August 6, 2024     Candido Victor MD  [address] [place] [id number]    Patient: Henrique Carvajal   YOB: 1955   Date of Visit: 8/6/2024       Dear Dr. Candido Victor MD:    Thank you for referring Henrique Carvajal to me for evaluation. Below are my notes for this consultation.  If you have questions, please do not hesitate to call me. I look forward to following your patient along with you.       Sincerely,     Grzegorz Hidalgo, DO      CC: No Recipients  ______________________________________________________________________________________    Subjective   Henrique Carvajal is a 68 y.o. male       Chief Complaint    Annual Exam          68-year-old gentleman returns for follow-up to see me for the first time, former patient of Dr. Edwards's.  He underwent acute coronary syndrome/non-ST elevation MI last October where I took him to the Cath Lab and underwent revascularization of the ramus branch and obtuse marginal branch x 2 drug-eluting stents.  Notably his sequential vein graft to the first and second diagonal branch and graft to the RCA and native RCA are occluded.  LIMA to LAD is patent but distal LAD has diffuse 50 to 70% small vessel disease, left ventricular function is low normal angiographically.    He has no recurrent angina, nitrate usage, hospitalizations, heart failure, shortness of breath.  His dyspnea is improved after switching from Brilinta to clopidogrel.  He is completely intolerant to statins and has tried all 4 different statins unsuccessfully.    He has previous history of myocardial infarction's treated in [facility] and in [place] with stenting and subsequent multivessel CABG in [place] dating back several years.    He is hemodynamically stable, continues working on a daily basis for his NYHA class is I/C    Recommendations, initiate investigation on Repatha injections, continue current therapies, renew Zetia, follow-up in 6 months         Review of  "Systems   All other systems reviewed and are negative.           Vitals:    08/06/24 1030   BP: 142/50   BP Location: Left arm   Patient Position: Sitting   Pulse: 52   Weight: 85.3 kg (188 lb)   Height: 1.753 m (5' 9\")        Objective   Physical Exam  Constitutional:       Appearance: Normal appearance.   HENT:      Nose: Nose normal.   Neck:      Vascular: No carotid bruit.   Cardiovascular:      Rate and Rhythm: Normal rate.      Pulses: Normal pulses.      Heart sounds: Normal heart sounds.   Pulmonary:      Effort: Pulmonary effort is normal.   Abdominal:      General: Bowel sounds are normal.      Palpations: Abdomen is soft.   Musculoskeletal:         General: Normal range of motion.      Cervical back: Normal range of motion.      Right lower leg: No edema.      Left lower leg: No edema.   Skin:     General: Skin is warm and dry.   Neurological:      General: No focal deficit present.      Mental Status: He is alert.   Psychiatric:         Mood and Affect: Mood normal.         Behavior: Behavior normal.         Thought Content: Thought content normal.         Judgment: Judgment normal.         Allergies  Lipitor [atorvastatin], Pravastatin, Rosuvastatin, Simvastatin, and Statins-hmg-coa reductase inhibitors     Current Medications    Current Outpatient Medications:   •  aspirin 81 mg EC tablet, Take 1 tablet (81 mg) by mouth once daily., Disp: , Rfl:   •  clopidogrel (Plavix) 75 mg tablet, Take 1 tablet (75 mg) by mouth once daily., Disp: 90 tablet, Rfl: 3  •  enalapril (Vasotec) 10 mg tablet, Take 1 tablet (10 mg) by mouth once daily., Disp: , Rfl:   •  ezetimibe (Zetia) 10 mg tablet, Take 1 tablet (10 mg) by mouth once daily at bedtime., Disp: 90 tablet, Rfl: 3  •  metoprolol succinate XL (Toprol-XL) 100 mg 24 hr tablet, , Disp: , Rfl:   •  nitroglycerin (Nitrostat) 0.4 mg SL tablet, , Disp: , Rfl:   •  omeprazole OTC (PriLOSEC OTC) 20 mg EC tablet, Take 1 tablet (20 mg) by mouth once daily in the " morning. Take before meals., Disp: , Rfl:                      Assessment/Plan   1. Atherosclerosis of coronary artery of native heart, unspecified vessel or lesion type, unspecified whether angina present        2. History of coronary artery bypass surgery        3. Hx of myocardial infarction        4. Statin intolerance        5. Essential hypertension        6. History of PTCA        7. Mixed hyperlipidemia        8. BMI 27.0-27.9,adult        9. Former smoker                 Scribe Attestation  By signing my name below, I, Joceline AVERY RN   , Scribe   attest that this documentation has been prepared under the direction and in the presence of Grzegorz Hidalgo DO.     Provider Attestation - Scribe documentation    All medical record entries made by the Scribe were at my direction and personally dictated by me. I have reviewed the chart and agree that the record accurately reflects my personal performance of the history, physical exam, discussion and plan.

## 2024-08-06 NOTE — PATIENT INSTRUCTIONS
Please bring all medicines, vitamins, and herbal supplements with you when you come to the office.    Prescriptions will not be filled unless you are compliant with your follow up appointments or have a follow up appointment scheduled as per instruction of your physician. Refills should be requested at the time of your visit.     BMI was above normal measurement. Current weight: 85.3 kg (188 lb)  Weight change since last visit (-) denotes wt loss 0.6 lbs   Weight loss needed to achieve BMI 25: 19.1 Lbs  Weight loss needed to achieve BMI 30: -14.7 Lbs  Provided instructions on dietary changes  Provided instructions on exercise.

## 2024-11-06 ENCOUNTER — APPOINTMENT (OUTPATIENT)
Dept: CARDIOLOGY | Facility: CLINIC | Age: 69
End: 2024-11-06
Payer: MEDICARE

## 2024-11-06 VITALS
WEIGHT: 192 LBS | SYSTOLIC BLOOD PRESSURE: 124 MMHG | DIASTOLIC BLOOD PRESSURE: 64 MMHG | HEIGHT: 69 IN | BODY MASS INDEX: 28.44 KG/M2 | HEART RATE: 58 BPM

## 2024-11-06 DIAGNOSIS — E78.2 MIXED HYPERLIPIDEMIA: ICD-10-CM

## 2024-11-06 DIAGNOSIS — Z78.9 STATIN INTOLERANCE: ICD-10-CM

## 2024-11-06 DIAGNOSIS — Z87.891 FORMER SMOKER: ICD-10-CM

## 2024-11-06 DIAGNOSIS — Z98.61 HISTORY OF PTCA: ICD-10-CM

## 2024-11-06 DIAGNOSIS — I25.10 ATHEROSCLEROSIS OF NATIVE CORONARY ARTERY OF NATIVE HEART WITHOUT ANGINA PECTORIS: ICD-10-CM

## 2024-11-06 DIAGNOSIS — Z95.1 HISTORY OF CORONARY ARTERY BYPASS SURGERY: ICD-10-CM

## 2024-11-06 PROCEDURE — 1036F TOBACCO NON-USER: CPT | Performed by: INTERNAL MEDICINE

## 2024-11-06 PROCEDURE — 1160F RVW MEDS BY RX/DR IN RCRD: CPT | Performed by: INTERNAL MEDICINE

## 2024-11-06 PROCEDURE — 1159F MED LIST DOCD IN RCRD: CPT | Performed by: INTERNAL MEDICINE

## 2024-11-06 PROCEDURE — 3074F SYST BP LT 130 MM HG: CPT | Performed by: INTERNAL MEDICINE

## 2024-11-06 PROCEDURE — 99214 OFFICE O/P EST MOD 30 MIN: CPT | Performed by: INTERNAL MEDICINE

## 2024-11-06 PROCEDURE — 3078F DIAST BP <80 MM HG: CPT | Performed by: INTERNAL MEDICINE

## 2024-11-06 PROCEDURE — 3008F BODY MASS INDEX DOCD: CPT | Performed by: INTERNAL MEDICINE

## 2024-11-06 NOTE — PATIENT INSTRUCTIONS
Please bring all medicines, vitamins, and herbal supplements with you when you come to the office.    Prescriptions will not be filled unless you are compliant with your follow up appointments or have a follow up appointment scheduled as per instruction of your physician. Refills should be requested at the time of your visit.     BMI was above normal measurement. Current weight: 87.1 kg (192 lb)  Weight change since last visit (-) denotes wt loss 4 lbs   Weight loss needed to achieve BMI 25: 23.1 Lbs  Weight loss needed to achieve BMI 30: -10.7 Lbs  Provided instructions on dietary changes  Provided instructions on exercise.

## 2024-11-06 NOTE — LETTER
November 6, 2024     Candido Victor MD  [address] [place] [id number]    Patient: Henrique Carvajal   YOB: 1955   Date of Visit: 11/6/2024       Dear Dr. Candido Victor MD:    Thank you for referring Henrique Carvajal to me for evaluation. Below are my notes for this consultation.  If you have questions, please do not hesitate to call me. I look forward to following your patient along with you.       Sincerely,     Grzegorz Hidalgo, DO      CC: No Recipients  ______________________________________________________________________________________    Subjective   Henrique Carvajal is a 69 y.o. male       Chief Complaint    Follow-up          69-year-old gentleman returns for 6-month follow-up, he is doing well he denies any cardiovascular events or complaints or nitrate usage.  He denies transitioning to Repatha injections even though we prescribed at last office visit.  We have counseled him again on importance of lipid-lowering and secondary prevention and compliance with therapies and we will again try to initiate Repatha injections for him.  He is otherwise intolerant as previously mentioned, to all statins.    His cardiovascular history is as below:   He underwent acute coronary syndrome/non-ST elevation MI last October where I took him to the Cath Lab and underwent revascularization of the ramus branch and obtuse marginal branch x 2 drug-eluting stents.  Notably his sequential vein graft to the first and second diagonal branch and graft to the RCA and native RCA are occluded.  LIMA to LAD is patent but distal LAD has diffuse 50 to 70% small vessel disease, left ventricular function is low normal angiographically.     He has no recurrent angina, nitrate usage, hospitalizations, heart failure, shortness of breath.  His dyspnea is improved after switching from Brilinta to clopidogrel.  He is completely intolerant to statins and has tried all 4 different statins unsuccessfully.    "  He has previous history of myocardial infarction's treated in [facility] and in [place] with stenting and subsequent multivessel CABG in [place] dating back several years.    Recommendations: Reinitiate Repatha 140 mg subcu twice a month, follow-up in 6 months, continue DAPT         Review of Systems   All other systems reviewed and are negative.           Vitals:    11/06/24 1052   BP: 124/64   BP Location: Left arm   Patient Position: Sitting   Pulse: 58   Weight: 87.1 kg (192 lb)   Height: 1.753 m (5' 9\")        Objective   Physical Exam  Constitutional:       Appearance: Normal appearance.   HENT:      Nose: Nose normal.   Neck:      Vascular: No carotid bruit.   Cardiovascular:      Rate and Rhythm: Normal rate.      Pulses: Normal pulses.      Heart sounds: Normal heart sounds.   Pulmonary:      Effort: Pulmonary effort is normal.   Abdominal:      General: Bowel sounds are normal.      Palpations: Abdomen is soft.   Musculoskeletal:         General: Normal range of motion.      Cervical back: Normal range of motion.      Right lower leg: No edema.      Left lower leg: No edema.   Skin:     General: Skin is warm and dry.   Neurological:      General: No focal deficit present.      Mental Status: He is alert.   Psychiatric:         Mood and Affect: Mood normal.         Behavior: Behavior normal.         Thought Content: Thought content normal.         Judgment: Judgment normal.         Allergies  Lipitor [atorvastatin], Pravastatin, Rosuvastatin, Simvastatin, and Statins-hmg-coa reductase inhibitors     Current Medications    Current Outpatient Medications:   •  aspirin 81 mg EC tablet, Take 1 tablet (81 mg) by mouth once daily., Disp: , Rfl:   •  clopidogrel (Plavix) 75 mg tablet, Take 1 tablet (75 mg) by mouth once daily., Disp: 90 tablet, Rfl: 3  •  enalapril (Vasotec) 10 mg tablet, Take 0.5 tablets (5 mg) by mouth once daily., Disp: , Rfl:   •  evolocumab (Repatha) 140 mg/mL injection, Inject 1 mL " (140 mg) under the skin every 14 (fourteen) days., Disp: 6 each, Rfl: 3  •  ezetimibe (Zetia) 10 mg tablet, Take 1 tablet (10 mg) by mouth once daily at bedtime., Disp: 90 tablet, Rfl: 3  •  metoprolol succinate XL (Toprol-XL) 100 mg 24 hr tablet, Take 1 tablet (100 mg) by mouth once daily., Disp: , Rfl:   •  nitroglycerin (Nitrostat) 0.4 mg SL tablet, , Disp: , Rfl:   •  omeprazole OTC (PriLOSEC OTC) 20 mg EC tablet, Take 1 tablet (20 mg) by mouth once daily in the morning. Take before meals., Disp: , Rfl:                      Assessment/Plan   1. Atherosclerosis of native coronary artery of native heart without angina pectoris  Follow Up In Cardiology      2. History of coronary artery bypass surgery        3. History of PTCA        4. Mixed hyperlipidemia        5. Statin intolerance        6. Former smoker        7. BMI 28.0-28.9,adult                 Scribe Attestation  By signing my name below, I, Joceline AVERY RN   , Scribe   attest that this documentation has been prepared under the direction and in the presence of Grzegorz Hidalgo DO.     Provider Attestation - Scribe documentation    All medical record entries made by the Scribe were at my direction and personally dictated by me. I have reviewed the chart and agree that the record accurately reflects my personal performance of the history, physical exam, discussion and plan.

## 2024-11-06 NOTE — PROGRESS NOTES
"Subjective   Henrique Carvajal is a 69 y.o. male       Chief Complaint    Follow-up          69-year-old gentleman returns for 6-month follow-up, he is doing well he denies any cardiovascular events or complaints or nitrate usage.  He denies transitioning to Repatha injections even though we prescribed at last office visit.  We have counseled him again on importance of lipid-lowering and secondary prevention and compliance with therapies and we will again try to initiate Repatha injections for him.  He is otherwise intolerant as previously mentioned, to all statins.    His cardiovascular history is as below:   He underwent acute coronary syndrome/non-ST elevation MI last October where I took him to the Cath Lab and underwent revascularization of the ramus branch and obtuse marginal branch x 2 drug-eluting stents.  Notably his sequential vein graft to the first and second diagonal branch and graft to the RCA and native RCA are occluded.  LIMA to LAD is patent but distal LAD has diffuse 50 to 70% small vessel disease, left ventricular function is low normal angiographically.     He has no recurrent angina, nitrate usage, hospitalizations, heart failure, shortness of breath.  His dyspnea is improved after switching from Brilinta to clopidogrel.  He is completely intolerant to statins and has tried all 4 different statins unsuccessfully.     He has previous history of myocardial infarction's treated in [facility] and in [place] with stenting and subsequent multivessel CABG in [place] dating back several years.    Recommendations: Reinitiate Repatha 140 mg subcu twice a month, follow-up in 6 months, continue DAPT         Review of Systems   All other systems reviewed and are negative.           Vitals:    11/06/24 1052   BP: 124/64   BP Location: Left arm   Patient Position: Sitting   Pulse: 58   Weight: 87.1 kg (192 lb)   Height: 1.753 m (5' 9\")        Objective   Physical Exam  Constitutional:       Appearance: Normal " appearance.   HENT:      Nose: Nose normal.   Neck:      Vascular: No carotid bruit.   Cardiovascular:      Rate and Rhythm: Normal rate.      Pulses: Normal pulses.      Heart sounds: Normal heart sounds.   Pulmonary:      Effort: Pulmonary effort is normal.   Abdominal:      General: Bowel sounds are normal.      Palpations: Abdomen is soft.   Musculoskeletal:         General: Normal range of motion.      Cervical back: Normal range of motion.      Right lower leg: No edema.      Left lower leg: No edema.   Skin:     General: Skin is warm and dry.   Neurological:      General: No focal deficit present.      Mental Status: He is alert.   Psychiatric:         Mood and Affect: Mood normal.         Behavior: Behavior normal.         Thought Content: Thought content normal.         Judgment: Judgment normal.         Allergies  Lipitor [atorvastatin], Pravastatin, Rosuvastatin, Simvastatin, and Statins-hmg-coa reductase inhibitors     Current Medications    Current Outpatient Medications:     aspirin 81 mg EC tablet, Take 1 tablet (81 mg) by mouth once daily., Disp: , Rfl:     clopidogrel (Plavix) 75 mg tablet, Take 1 tablet (75 mg) by mouth once daily., Disp: 90 tablet, Rfl: 3    enalapril (Vasotec) 10 mg tablet, Take 0.5 tablets (5 mg) by mouth once daily., Disp: , Rfl:     evolocumab (Repatha) 140 mg/mL injection, Inject 1 mL (140 mg) under the skin every 14 (fourteen) days., Disp: 6 each, Rfl: 3    ezetimibe (Zetia) 10 mg tablet, Take 1 tablet (10 mg) by mouth once daily at bedtime., Disp: 90 tablet, Rfl: 3    metoprolol succinate XL (Toprol-XL) 100 mg 24 hr tablet, Take 1 tablet (100 mg) by mouth once daily., Disp: , Rfl:     nitroglycerin (Nitrostat) 0.4 mg SL tablet, , Disp: , Rfl:     omeprazole OTC (PriLOSEC OTC) 20 mg EC tablet, Take 1 tablet (20 mg) by mouth once daily in the morning. Take before meals., Disp: , Rfl:                      Assessment/Plan   1. Atherosclerosis of native coronary artery of native  heart without angina pectoris  Follow Up In Cardiology      2. History of coronary artery bypass surgery        3. History of PTCA        4. Mixed hyperlipidemia        5. Statin intolerance        6. Former smoker        7. BMI 28.0-28.9,adult                 Scribe Attestation  By signing my name below, I, Joceline AVERY RN   , Scribe   attest that this documentation has been prepared under the direction and in the presence of Grzegorz Hidalgo DO.     Provider Attestation - Scribe documentation    All medical record entries made by the Scribe were at my direction and personally dictated by me. I have reviewed the chart and agree that the record accurately reflects my personal performance of the history, physical exam, discussion and plan.

## 2025-02-11 ENCOUNTER — APPOINTMENT (OUTPATIENT)
Dept: CARDIOLOGY | Facility: CLINIC | Age: 70
End: 2025-02-11
Payer: MEDICARE

## 2025-02-21 ENCOUNTER — TELEPHONE (OUTPATIENT)
Dept: CARDIOLOGY | Facility: CLINIC | Age: 70
End: 2025-02-21
Payer: MEDICARE

## 2025-05-06 ENCOUNTER — APPOINTMENT (OUTPATIENT)
Dept: CARDIOLOGY | Facility: CLINIC | Age: 70
End: 2025-05-06
Payer: MEDICARE

## 2025-05-06 VITALS
WEIGHT: 195.2 LBS | HEIGHT: 69 IN | SYSTOLIC BLOOD PRESSURE: 102 MMHG | DIASTOLIC BLOOD PRESSURE: 64 MMHG | BODY MASS INDEX: 28.91 KG/M2 | HEART RATE: 60 BPM

## 2025-05-06 DIAGNOSIS — Z95.1 HISTORY OF CORONARY ARTERY BYPASS SURGERY: ICD-10-CM

## 2025-05-06 DIAGNOSIS — Z87.891 FORMER SMOKER: ICD-10-CM

## 2025-05-06 DIAGNOSIS — Z98.61 HISTORY OF PTCA: ICD-10-CM

## 2025-05-06 DIAGNOSIS — E78.2 MIXED HYPERLIPIDEMIA: ICD-10-CM

## 2025-05-06 DIAGNOSIS — I25.10 ATHEROSCLEROSIS OF NATIVE CORONARY ARTERY OF NATIVE HEART WITHOUT ANGINA PECTORIS: ICD-10-CM

## 2025-05-06 PROCEDURE — 1160F RVW MEDS BY RX/DR IN RCRD: CPT | Performed by: INTERNAL MEDICINE

## 2025-05-06 PROCEDURE — 1036F TOBACCO NON-USER: CPT | Performed by: INTERNAL MEDICINE

## 2025-05-06 PROCEDURE — 99214 OFFICE O/P EST MOD 30 MIN: CPT | Performed by: INTERNAL MEDICINE

## 2025-05-06 PROCEDURE — 3078F DIAST BP <80 MM HG: CPT | Performed by: INTERNAL MEDICINE

## 2025-05-06 PROCEDURE — 1159F MED LIST DOCD IN RCRD: CPT | Performed by: INTERNAL MEDICINE

## 2025-05-06 PROCEDURE — 3074F SYST BP LT 130 MM HG: CPT | Performed by: INTERNAL MEDICINE

## 2025-05-06 PROCEDURE — 3008F BODY MASS INDEX DOCD: CPT | Performed by: INTERNAL MEDICINE

## 2025-05-06 RX ORDER — CLOPIDOGREL BISULFATE 75 MG/1
75 TABLET ORAL
COMMUNITY
Start: 2025-04-10 | End: 2026-04-10

## 2025-05-06 RX ORDER — ENALAPRIL MALEATE 5 MG/1
5 TABLET ORAL DAILY
COMMUNITY
Start: 2025-04-14

## 2025-05-06 RX ORDER — SULFAMETHOXAZOLE AND TRIMETHOPRIM 800; 160 MG/1; MG/1
1 TABLET ORAL 2 TIMES DAILY
COMMUNITY
Start: 2024-12-30 | End: 2026-04-10

## 2025-05-06 ASSESSMENT — ENCOUNTER SYMPTOMS: DYSPNEA ON EXERTION: 1

## 2025-05-06 NOTE — LETTER
May 6, 2025     Candido Victor MD   [address] [place] [id number]    Patient: Henrique Carvajal   YOB: 1955   Date of Visit: 5/6/2025       Dear Dr. Candido Victor MD:    Thank you for referring Henrique Carvajal to me for evaluation. Below are my notes for this consultation.  If you have questions, please do not hesitate to call me. I look forward to following your patient along with you.       Sincerely,     Grzegorz Hidalgo, DO      CC: No Recipients  ______________________________________________________________________________________    Chief Complaint   Patient presents with   • Follow-up     6 month, coronary artery disease        Subjective   Henrique Carvajal is a 69 y.o. male     69-year-old gentleman returns for annual cardiovascular visit for continued and ongoing surveillance.  He is doing well he denies any cardiovascular events or complaints or nitrate usage or repeat hospitalizations.  He states he is put on several pounds over the winter currently at 195 pounds per his statement but does not appear overall obese in my opinion.  He is otherwise reasonably active.    He has a history of known ASHD with remote CABG in 2001, subsequent ST elevation MI in 2023 with revascularization of the ramus branch and OM branch of the circumflex with 3 x 12 and 2.75 x 18 mm Lisandro stents at that time with mild LV dysfunction.  His LIMA to LAD was widely patent, sequential vein graft to the diagonal 1 branch, diagonal 2 branch was occluded, native RCA and vein graft RCA were occluded.  Follow-up echo imaging revealed normal LV function with ejection fraction of 60 to 65%    Remains compliant on current therapies including DAPT, Repatha, ACE and metoprolol succinate    Recommendations: Obtain lipid panel; follow-up again in 1 year         Review of Systems   Cardiovascular:  Positive for dyspnea on exertion.   All other systems reviewed and are negative.           Vitals:    05/06/25  "1033   BP: 102/64   BP Location: Left arm   Patient Position: Sitting   Pulse: 60   Weight: 88.5 kg (195 lb 3.2 oz)   Height: 1.753 m (5' 9\")        Objective   Physical Exam  Constitutional:       Appearance: Normal appearance.   HENT:      Nose: Nose normal.   Neck:      Vascular: No carotid bruit.   Cardiovascular:      Rate and Rhythm: Normal rate.      Pulses: Normal pulses.      Heart sounds: Normal heart sounds.   Pulmonary:      Effort: Pulmonary effort is normal.   Abdominal:      General: Bowel sounds are normal.      Palpations: Abdomen is soft.   Musculoskeletal:         General: Normal range of motion.      Cervical back: Normal range of motion.      Right lower leg: No edema.      Left lower leg: No edema.   Skin:     General: Skin is warm and dry.   Neurological:      General: No focal deficit present.      Mental Status: He is alert.   Psychiatric:         Mood and Affect: Mood normal.         Behavior: Behavior normal.         Thought Content: Thought content normal.         Judgment: Judgment normal.         Allergies  Lipitor [atorvastatin], Pravastatin, Rosuvastatin, Simvastatin, and Statins-hmg-coa reductase inhibitors     Current Medications  Current Outpatient Medications   Medication Instructions   • aspirin 81 mg, Daily   • clopidogrel (PLAVIX) 75 mg, Daily RT   • enalapril (VASOTEC) 5 mg, Daily   • evolocumab (REPATHA) 140 mg, subcutaneous, Every 14 days   • metoprolol succinate XL (Toprol-XL) 100 mg 24 hr tablet Take 1 tablet (100 mg) by mouth once daily.   • nitroglycerin (Nitrostat) 0.4 mg SL tablet Place 1 tablet (0.4 mg) under the tongue every 5 minutes if needed for chest pain.   • omeprazole OTC (PRILOSEC OTC) 20 mg, Daily before breakfast   • sulfamethoxazole-trimethoprim (Bactrim DS) 800-160 mg tablet 1 tablet, 2 times daily                        Assessment/Plan   1. Atherosclerosis of native coronary artery of native heart without angina pectoris  Follow Up In Cardiology      2. " History of coronary artery bypass surgery        3. History of PTCA        4. Mixed hyperlipidemia        5. BMI 28.0-28.9,adult        6. Former smoker                 Scribe Attestation  By signing my name below, I, Eduar Mei LPNibe   attest that this documentation has been prepared under the direction and in the presence of Grzegorz Hidalgo DO.     Provider Attestation - Scribe documentation    All medical record entries made by the Scribe were at my direction and personally dictated by me. I have reviewed the chart and agree that the record accurately reflects my personal performance of the history, physical exam, discussion and plan.

## 2025-05-06 NOTE — PATIENT INSTRUCTIONS
Please bring all medicines, vitamins, and herbal supplements with you when you come to the office.    Prescriptions will not be filled unless you are compliant with your follow up appointments or have a follow up appointment scheduled as per instruction of your physician. Refills should be requested at the time of your visit.   BMI was above normal measurement. Current weight: 88.5 kg (195 lb 3.2 oz)  Weight change since last visit (-) denotes wt loss 3.2 lbs   Weight loss needed to achieve BMI 25: 26.3 Lbs  Weight loss needed to achieve BMI 30: -7.5 Lbs  Provided instructions on dietary changes.

## 2025-05-06 NOTE — PROGRESS NOTES
"Chief Complaint   Patient presents with    Follow-up     6 month, coronary artery disease        Subjective   Henrique Carvajal is a 69 y.o. male     69-year-old gentleman returns for annual cardiovascular visit for continued and ongoing surveillance.  He is doing well he denies any cardiovascular events or complaints or nitrate usage or repeat hospitalizations.  He states he is put on several pounds over the winter currently at 195 pounds per his statement but does not appear overall obese in my opinion.  He is otherwise reasonably active.    He has a history of known ASHD with remote CABG in 2001, subsequent ST elevation MI in 2023 with revascularization of the ramus branch and OM branch of the circumflex with 3 x 12 and 2.75 x 18 mm Lisandro stents at that time with mild LV dysfunction.  His LIMA to LAD was widely patent, sequential vein graft to the diagonal 1 branch, diagonal 2 branch was occluded, native RCA and vein graft RCA were occluded.  Follow-up echo imaging revealed normal LV function with ejection fraction of 60 to 65%    Remains compliant on current therapies including DAPT, Repatha, ACE and metoprolol succinate    Recommendations: Obtain lipid panel; follow-up again in 1 year         Review of Systems   Cardiovascular:  Positive for dyspnea on exertion.   All other systems reviewed and are negative.           Vitals:    05/06/25 1033   BP: 102/64   BP Location: Left arm   Patient Position: Sitting   Pulse: 60   Weight: 88.5 kg (195 lb 3.2 oz)   Height: 1.753 m (5' 9\")        Objective   Physical Exam  Constitutional:       Appearance: Normal appearance.   HENT:      Nose: Nose normal.   Neck:      Vascular: No carotid bruit.   Cardiovascular:      Rate and Rhythm: Normal rate.      Pulses: Normal pulses.      Heart sounds: Normal heart sounds.   Pulmonary:      Effort: Pulmonary effort is normal.   Abdominal:      General: Bowel sounds are normal.      Palpations: Abdomen is soft.   Musculoskeletal:        "  General: Normal range of motion.      Cervical back: Normal range of motion.      Right lower leg: No edema.      Left lower leg: No edema.   Skin:     General: Skin is warm and dry.   Neurological:      General: No focal deficit present.      Mental Status: He is alert.   Psychiatric:         Mood and Affect: Mood normal.         Behavior: Behavior normal.         Thought Content: Thought content normal.         Judgment: Judgment normal.         Allergies  Lipitor [atorvastatin], Pravastatin, Rosuvastatin, Simvastatin, and Statins-hmg-coa reductase inhibitors     Current Medications  Current Outpatient Medications   Medication Instructions    aspirin 81 mg, Daily    clopidogrel (PLAVIX) 75 mg, Daily RT    enalapril (VASOTEC) 5 mg, Daily    evolocumab (REPATHA) 140 mg, subcutaneous, Every 14 days    metoprolol succinate XL (Toprol-XL) 100 mg 24 hr tablet Take 1 tablet (100 mg) by mouth once daily.    nitroglycerin (Nitrostat) 0.4 mg SL tablet Place 1 tablet (0.4 mg) under the tongue every 5 minutes if needed for chest pain.    omeprazole OTC (PRILOSEC OTC) 20 mg, Daily before breakfast    sulfamethoxazole-trimethoprim (Bactrim DS) 800-160 mg tablet 1 tablet, 2 times daily                        Assessment/Plan   1. Atherosclerosis of native coronary artery of native heart without angina pectoris  Follow Up In Cardiology      2. History of coronary artery bypass surgery        3. History of PTCA        4. Mixed hyperlipidemia        5. BMI 28.0-28.9,adult        6. Former smoker                 Scribe Attestation  By signing my name below, IElida LPN Scribe   attest that this documentation has been prepared under the direction and in the presence of Grzegorz Hidalgo DO.     Provider Attestation - Scribe documentation    All medical record entries made by the Scribe were at my direction and personally dictated by me. I have reviewed the chart and agree that the record accurately reflects my personal  performance of the history, physical exam, discussion and plan.

## 2025-07-10 ENCOUNTER — TELEPHONE (OUTPATIENT)
Dept: CARDIOLOGY | Facility: CLINIC | Age: 70
End: 2025-07-10
Payer: MEDICARE

## 2025-07-10 NOTE — TELEPHONE ENCOUNTER
Received a Medication hold request [facility]. Patient scheduled 7-2-25 for colonoscopy. Dr Ortiz. Requesting to hold Plavix 5 days prior to procedure. To Dr Hidalgo for review.    Fax- [phone]  Office- [phone]

## 2026-05-05 ENCOUNTER — APPOINTMENT (OUTPATIENT)
Dept: CARDIOLOGY | Facility: CLINIC | Age: 71
End: 2026-05-05
Payer: MEDICARE